# Patient Record
Sex: FEMALE | Race: WHITE | Employment: OTHER | URBAN - METROPOLITAN AREA
[De-identification: names, ages, dates, MRNs, and addresses within clinical notes are randomized per-mention and may not be internally consistent; named-entity substitution may affect disease eponyms.]

---

## 2017-06-29 ENCOUNTER — HOSPITAL ENCOUNTER (OUTPATIENT)
Dept: MAMMOGRAPHY | Age: 67
Discharge: HOME | End: 2017-06-29
Payer: MEDICARE

## 2017-06-29 DIAGNOSIS — R92.8 ABNORMAL MAMMOGRAM: ICD-10-CM

## 2017-06-29 PROCEDURE — G0206 DX MAMMO INCL CAD UNI: HCPCS

## 2018-01-29 ENCOUNTER — HOSPITAL ENCOUNTER (OUTPATIENT)
Dept: MAMMOGRAPHY | Age: 68
Discharge: HOME | End: 2018-01-29
Payer: MEDICARE

## 2018-01-29 DIAGNOSIS — M80.00XA AGE-RELATED OSTEOPOROSIS WITH CURRENT PATHOLOGICAL FRACTURE, INITIAL ENCOUNTER: ICD-10-CM

## 2018-01-29 DIAGNOSIS — R92.8 ABNORMAL MAMMOGRAM: ICD-10-CM

## 2018-01-29 PROCEDURE — G0279 TOMOSYNTHESIS, MAMMO: HCPCS

## 2018-01-29 PROCEDURE — 77080 DXA BONE DENSITY AXIAL: CPT

## 2018-07-20 ENCOUNTER — HOSPITAL ENCOUNTER (OUTPATIENT)
Dept: MAMMOGRAPHY | Age: 68
Discharge: HOME | End: 2018-07-22
Payer: MEDICARE

## 2018-07-20 DIAGNOSIS — R92.8 ABNORMAL MAMMOGRAM: ICD-10-CM

## 2018-07-20 PROCEDURE — G0279 TOMOSYNTHESIS, MAMMO: HCPCS

## 2018-10-27 ENCOUNTER — APPOINTMENT (OUTPATIENT)
Dept: CT IMAGING | Facility: CLINIC | Age: 68
DRG: 262 | End: 2018-10-27
Payer: MEDICARE

## 2018-10-27 ENCOUNTER — HOSPITAL ENCOUNTER (INPATIENT)
Age: 68
LOS: 3 days | Discharge: HOME | DRG: 262 | End: 2018-10-30
Attending: EMERGENCY MEDICINE | Admitting: FAMILY MEDICINE
Payer: MEDICARE

## 2018-10-27 DIAGNOSIS — R55 SYNCOPE AND COLLAPSE: Primary | ICD-10-CM

## 2018-10-27 DIAGNOSIS — S02.2XXA CLOSED FRACTURE OF NASAL BONE, INITIAL ENCOUNTER: ICD-10-CM

## 2018-10-27 DIAGNOSIS — S09.90XA CLOSED HEAD INJURY, INITIAL ENCOUNTER: ICD-10-CM

## 2018-10-27 LAB
-: ABNORMAL
ABSOLUTE EOS #: 0.1 K/UL (ref 0–0.4)
ABSOLUTE IMMATURE GRANULOCYTE: ABNORMAL K/UL (ref 0–0.3)
ABSOLUTE LYMPH #: 2.2 K/UL (ref 1–4.8)
ABSOLUTE MONO #: 0.6 K/UL (ref 0.1–1.2)
AMORPHOUS: ABNORMAL
ANION GAP SERPL CALCULATED.3IONS-SCNC: 14 MMOL/L (ref 9–17)
BACTERIA: ABNORMAL
BASOPHILS # BLD: 1 % (ref 0–2)
BASOPHILS ABSOLUTE: 0.1 K/UL (ref 0–0.2)
BILIRUBIN URINE: NEGATIVE
BUN BLDV-MCNC: 18 MG/DL (ref 8–23)
BUN/CREAT BLD: ABNORMAL (ref 9–20)
CALCIUM SERPL-MCNC: 9.3 MG/DL (ref 8.6–10.4)
CASTS UA: ABNORMAL /LPF (ref 0–2)
CHLORIDE BLD-SCNC: 105 MMOL/L (ref 98–107)
CO2: 24 MMOL/L (ref 20–31)
COLOR: YELLOW
COMMENT UA: ABNORMAL
CREAT SERPL-MCNC: 0.9 MG/DL (ref 0.5–0.9)
CRYSTALS, UA: ABNORMAL /HPF
DIFFERENTIAL TYPE: ABNORMAL
EKG ATRIAL RATE: 71 BPM
EKG P AXIS: 57 DEGREES
EKG P-R INTERVAL: 160 MS
EKG Q-T INTERVAL: 412 MS
EKG QRS DURATION: 96 MS
EKG QTC CALCULATION (BAZETT): 447 MS
EKG R AXIS: 69 DEGREES
EKG T AXIS: 34 DEGREES
EKG VENTRICULAR RATE: 71 BPM
EOSINOPHILS RELATIVE PERCENT: 2 % (ref 1–4)
EPITHELIAL CELLS UA: ABNORMAL /HPF (ref 0–5)
GFR AFRICAN AMERICAN: >60 ML/MIN
GFR NON-AFRICAN AMERICAN: >60 ML/MIN
GFR SERPL CREATININE-BSD FRML MDRD: ABNORMAL ML/MIN/{1.73_M2}
GFR SERPL CREATININE-BSD FRML MDRD: ABNORMAL ML/MIN/{1.73_M2}
GLUCOSE BLD-MCNC: 110 MG/DL (ref 70–99)
GLUCOSE URINE: NEGATIVE
HCT VFR BLD CALC: 47 % (ref 36–46)
HEMOGLOBIN: 15.5 G/DL (ref 12–16)
IMMATURE GRANULOCYTES: ABNORMAL %
INR BLD: 1
KETONES, URINE: NEGATIVE
LEUKOCYTE ESTERASE, URINE: NEGATIVE
LYMPHOCYTES # BLD: 28 % (ref 24–44)
MCH RBC QN AUTO: 32.3 PG (ref 26–34)
MCHC RBC AUTO-ENTMCNC: 33 G/DL (ref 31–37)
MCV RBC AUTO: 97.9 FL (ref 80–100)
MONOCYTES # BLD: 8 % (ref 2–11)
MUCUS: ABNORMAL
NITRITE, URINE: NEGATIVE
NRBC AUTOMATED: ABNORMAL PER 100 WBC
OTHER OBSERVATIONS UA: ABNORMAL
PARTIAL THROMBOPLASTIN TIME: 24.3 SEC (ref 21.3–31.3)
PDW BLD-RTO: 14.6 % (ref 12.5–15.4)
PH UA: 6 (ref 5–8)
PLATELET # BLD: 337 K/UL (ref 140–450)
PLATELET ESTIMATE: ABNORMAL
PMV BLD AUTO: 7.7 FL (ref 6–12)
POTASSIUM SERPL-SCNC: 3.9 MMOL/L (ref 3.7–5.3)
PROTEIN UA: ABNORMAL
PROTHROMBIN TIME: 10.4 SEC (ref 9.4–12.6)
RBC # BLD: 4.8 M/UL (ref 4–5.2)
RBC # BLD: ABNORMAL 10*6/UL
RBC UA: ABNORMAL /HPF (ref 0–2)
RENAL EPITHELIAL, UA: ABNORMAL /HPF
SEG NEUTROPHILS: 61 % (ref 36–66)
SEGMENTED NEUTROPHILS ABSOLUTE COUNT: 4.8 K/UL (ref 1.8–7.7)
SODIUM BLD-SCNC: 143 MMOL/L (ref 135–144)
SPECIFIC GRAVITY UA: 1.03 (ref 1–1.03)
TRICHOMONAS: ABNORMAL
TROPONIN INTERP: NORMAL
TROPONIN T: <0.03 NG/ML
TURBIDITY: CLEAR
URINE HGB: NEGATIVE
UROBILINOGEN, URINE: NORMAL
WBC # BLD: 7.9 K/UL (ref 3.5–11)
WBC # BLD: ABNORMAL 10*3/UL
WBC UA: ABNORMAL /HPF (ref 0–5)
YEAST: ABNORMAL

## 2018-10-27 PROCEDURE — 6370000000 HC RX 637 (ALT 250 FOR IP): Performed by: EMERGENCY MEDICINE

## 2018-10-27 PROCEDURE — 80048 BASIC METABOLIC PNL TOTAL CA: CPT

## 2018-10-27 PROCEDURE — 70486 CT MAXILLOFACIAL W/O DYE: CPT

## 2018-10-27 PROCEDURE — 93005 ELECTROCARDIOGRAM TRACING: CPT

## 2018-10-27 PROCEDURE — 6370000000 HC RX 637 (ALT 250 FOR IP): Performed by: FAMILY MEDICINE

## 2018-10-27 PROCEDURE — 2060000000 HC ICU INTERMEDIATE R&B

## 2018-10-27 PROCEDURE — 85610 PROTHROMBIN TIME: CPT

## 2018-10-27 PROCEDURE — 85025 COMPLETE CBC W/AUTO DIFF WBC: CPT

## 2018-10-27 PROCEDURE — 99285 EMERGENCY DEPT VISIT HI MDM: CPT

## 2018-10-27 PROCEDURE — 70450 CT HEAD/BRAIN W/O DYE: CPT

## 2018-10-27 PROCEDURE — 36415 COLL VENOUS BLD VENIPUNCTURE: CPT

## 2018-10-27 PROCEDURE — 84484 ASSAY OF TROPONIN QUANT: CPT

## 2018-10-27 PROCEDURE — 81001 URINALYSIS AUTO W/SCOPE: CPT

## 2018-10-27 PROCEDURE — 72125 CT NECK SPINE W/O DYE: CPT

## 2018-10-27 PROCEDURE — 85730 THROMBOPLASTIN TIME PARTIAL: CPT

## 2018-10-27 RX ORDER — VENLAFAXINE 25 MG/1
50 TABLET ORAL DAILY
Status: DISCONTINUED | OUTPATIENT
Start: 2018-10-28 | End: 2018-10-29 | Stop reason: CLARIF

## 2018-10-27 RX ORDER — HYDROCODONE BITARTRATE AND ACETAMINOPHEN 5; 325 MG/1; MG/1
1 TABLET ORAL ONCE
Status: COMPLETED | OUTPATIENT
Start: 2018-10-27 | End: 2018-10-27

## 2018-10-27 RX ORDER — LEVOTHYROXINE SODIUM 0.1 MG/1
100 TABLET ORAL DAILY
COMMUNITY

## 2018-10-27 RX ORDER — DOXYCYCLINE 100 MG/1
100 CAPSULE ORAL 2 TIMES DAILY
Status: DISCONTINUED | OUTPATIENT
Start: 2018-10-27 | End: 2018-10-30 | Stop reason: HOSPADM

## 2018-10-27 RX ORDER — HYDROCHLOROTHIAZIDE 25 MG/1
25 TABLET ORAL DAILY
Status: DISCONTINUED | OUTPATIENT
Start: 2018-10-28 | End: 2018-10-28

## 2018-10-27 RX ORDER — MONTELUKAST SODIUM 10 MG/1
10 TABLET ORAL NIGHTLY
Status: DISCONTINUED | OUTPATIENT
Start: 2018-10-27 | End: 2018-10-30 | Stop reason: HOSPADM

## 2018-10-27 RX ORDER — MELOXICAM 15 MG/1
15 TABLET ORAL DAILY
Status: ON HOLD | COMMUNITY
End: 2018-10-30 | Stop reason: HOSPADM

## 2018-10-27 RX ORDER — VENLAFAXINE 50 MG/1
50 TABLET ORAL DAILY
Status: ON HOLD | COMMUNITY
End: 2018-10-29 | Stop reason: DRUGHIGH

## 2018-10-27 RX ORDER — MONTELUKAST SODIUM 10 MG/1
10 TABLET ORAL NIGHTLY
COMMUNITY

## 2018-10-27 RX ORDER — LEVOTHYROXINE SODIUM 0.1 MG/1
100 TABLET ORAL DAILY
Status: DISCONTINUED | OUTPATIENT
Start: 2018-10-28 | End: 2018-10-30 | Stop reason: HOSPADM

## 2018-10-27 RX ORDER — MELOXICAM 7.5 MG/1
15 TABLET ORAL DAILY
Status: DISCONTINUED | OUTPATIENT
Start: 2018-10-28 | End: 2018-10-30 | Stop reason: HOSPADM

## 2018-10-27 RX ORDER — DOXYCYCLINE HYCLATE 100 MG/1
100 CAPSULE ORAL 2 TIMES DAILY
COMMUNITY

## 2018-10-27 RX ORDER — HYDROCODONE BITARTRATE AND ACETAMINOPHEN 5; 325 MG/1; MG/1
1 TABLET ORAL 3 TIMES DAILY PRN
Status: DISCONTINUED | OUTPATIENT
Start: 2018-10-27 | End: 2018-10-30 | Stop reason: HOSPADM

## 2018-10-27 RX ADMIN — HYDROCODONE BITARTRATE AND ACETAMINOPHEN 1 TABLET: 5; 325 TABLET ORAL at 21:36

## 2018-10-27 RX ADMIN — HYDROCODONE BITARTRATE AND ACETAMINOPHEN 1 TABLET: 5; 325 TABLET ORAL at 18:15

## 2018-10-27 ASSESSMENT — PAIN DESCRIPTION - DESCRIPTORS
DESCRIPTORS: ACHING;HEADACHE
DESCRIPTORS: ACHING

## 2018-10-27 ASSESSMENT — PAIN SCALES - GENERAL
PAINLEVEL_OUTOF10: 4
PAINLEVEL_OUTOF10: 6

## 2018-10-27 ASSESSMENT — PAIN DESCRIPTION - FREQUENCY
FREQUENCY: INTERMITTENT
FREQUENCY: INTERMITTENT

## 2018-10-27 ASSESSMENT — PAIN DESCRIPTION - LOCATION
LOCATION: HEAD
LOCATION: HEAD
LOCATION: HEAD;NECK

## 2018-10-27 ASSESSMENT — PAIN DESCRIPTION - PAIN TYPE
TYPE: ACUTE PAIN
TYPE: ACUTE PAIN

## 2018-10-27 ASSESSMENT — PAIN DESCRIPTION - PROGRESSION
CLINICAL_PROGRESSION: GRADUALLY IMPROVING
CLINICAL_PROGRESSION: NOT CHANGED

## 2018-10-27 ASSESSMENT — PAIN DESCRIPTION - ONSET: ONSET: ON-GOING

## 2018-10-27 ASSESSMENT — PAIN DESCRIPTION - ORIENTATION
ORIENTATION: RIGHT;LEFT
ORIENTATION: DISTAL

## 2018-10-27 NOTE — ED NOTES
Pt wishes to go to [facility].      Joaquin Grossman RN  10/27/18 1902

## 2018-10-27 NOTE — ED PROVIDER NOTES
reviewed. DIFFERENTIAL DIAGNOSIS/ MDM:   Have initiated a syncope workup and will look for intracranial trauma and facial trauma. DIAGNOSTIC RESULTS     EKG:  EKG as interpreted by myself as showing a normal sinus rhythm without any acute ST segment changes. Rate axis and intervals are normal.    RADIOLOGY:   Ct Head Wo Contrast    Result Date: 10/27/2018  EXAMINATION: CT OF THE HEAD WITHOUT CONTRAST  10/27/2018 3:59 pm TECHNIQUE: CT of the head was performed without the administration of intravenous contrast. Dose modulation, iterative reconstruction, and/or weight based adjustment of the mA/kV was utilized to reduce the radiation dose to as low as reasonably achievable. COMPARISON: None. HISTORY: ORDERING SYSTEM PROVIDED HISTORY: HEAD INJURY MILD OR MODERATE ACUTE, NO NEUROLOGICAL DEFICIT TECHNOLOGIST PROVIDED HISTORY: Ordering Physician Provided Reason for Exam: pain and bump to left side of head Acuity: Acute Type of Exam: Initial Mechanism of Injury: fall Relevant Medical/Surgical History: NA FINDINGS: INTRACRANIAL STRUCTURES/VENTRICLES: There is mild parenchymal volume loss. There is mild T2/FLAIR hyperintensity in the periventricular and subcortical white matter, likely related to mild chronic microvascular disease. There is no acute infarct. No mass effect or midline shift. No evidence of an acute intracranial hemorrhage. There is no evidence of hydrocephalus. The sellar/suprasellar regions appear unremarkable. The normal signal voids within the major intracranial vessels appear maintained. ORBITS: The visualized portion of the orbits demonstrate no acute abnormality. SINUSES: There is scattered minimal mucosal thickening in the paranasal sinuses. The bilateral mastoid air cells are well aerated. BONES/SOFT TISSUES: The bone marrow signal intensity appears normal.  There is left parietal scalp and midline forehead scalp subcutaneous soft tissue swelling. No acute intracranial abnormality. Injury: fall Relevant Medical/Surgical History: NA; ORDERING SYSTEM PROVIDED HISTORY: NECK PAIN FOLLOWING TRAUMA TECHNOLOGIST PROVIDED HISTORY: Ordering Physician Provided Reason for Exam: posterior neck pain Acuity: Acute Type of Exam: Initial Mechanism of Injury: fall Relevant Medical/Surgical History: NA FINDINGS: CT facial bone: FACIAL BONES: The maxilla, pterygoid plates and zygomatic arches are intact. The mandible is intact. The mandibular condyles are normally situated. There is question of acute nasal bone fractures, left more than right. ORBITS:  The globes appear intact. The extraocular muscles, optic nerve sheath complexes and lacrimal glands appear unremarkable. No retrobulbar hematoma or mass is seen. The orbital walls and rims are intact. SINUSES/MASTOIDS: There is scattered mild mucosal thickening in the paranasal sinuses. The bilateral mastoid air cells are clear. No acute fracture is seen. SOFT TISSUES: There is inferior forehead scalp, bilateral periorbital and nasal soft tissue swelling. CT cervical spine: BONES/ALIGNMENT: There is straightening of normal cervical stenosis. There is normal alignment of the cervical spine. The vertebral body heights are maintained. No osseous destructive lesion is seen. DEGENERATIVE CHANGES: There is degenerative disc disease with mild disc space narrowing, mild endplate changes and small endplate osteophyte formation. There is no evidence of spinal canal narrowing. SOFT TISSUES: No paraspinal mass is seen. Question of acute nasal bone fractures, left more than right. Inferior forehead scalp, bilateral periorbital and nasal soft tissue swelling. No acute abnormality in the cervical spine.          LABS:  Results for orders placed or performed during the hospital encounter of 10/27/18   CBC auto differential   Result Value Ref Range    WBC 7.9 3.5 - 11.0 k/uL    RBC 4.80 4.0 - 5.2 m/uL    Hemoglobin 15.5 12.0 - 16.0 g/dL    Hematocrit 47.0 (H) 36 - 46 % MCV 97.9 80 - 100 fL    MCH 32.3 26 - 34 pg    MCHC 33.0 31 - 37 g/dL    RDW 14.6 12.5 - 15.4 %    Platelets 308 835 - 954 k/uL    MPV 7.7 6.0 - 12.0 fL    NRBC Automated NOT REPORTED per 100 WBC    Differential Type NOT REPORTED     Seg Neutrophils 61 36 - 66 %    Lymphocytes 28 24 - 44 %    Monocytes 8 2 - 11 %    Eosinophils % 2 1 - 4 %    Basophils 1 0 - 2 %    Immature Granulocytes NOT REPORTED 0 %    Segs Absolute 4.80 1.8 - 7.7 k/uL    Absolute Lymph # 2.20 1.0 - 4.8 k/uL    Absolute Mono # 0.60 0.1 - 1.2 k/uL    Absolute Eos # 0.10 0.0 - 0.4 k/uL    Basophils # 0.10 0.0 - 0.2 k/uL    Absolute Immature Granulocyte NOT REPORTED 0.00 - 0.30 k/uL    WBC Morphology NOT REPORTED     RBC Morphology NOT REPORTED     Platelet Estimate NOT REPORTED    Basic Metabolic Panel w/ Reflex to MG   Result Value Ref Range    Glucose 110 (H) 70 - 99 mg/dL    BUN 18 8 - 23 mg/dL    CREATININE 0.90 0.50 - 0.90 mg/dL    Bun/Cre Ratio NOT REPORTED 9 - 20    Calcium 9.3 8.6 - 10.4 mg/dL    Sodium 143 135 - 144 mmol/L    Potassium 3.9 3.7 - 5.3 mmol/L    Chloride 105 98 - 107 mmol/L    CO2 24 20 - 31 mmol/L    Anion Gap 14 9 - 17 mmol/L    GFR Non-African American >60 >60 mL/min    GFR African American >60 >60 mL/min    GFR Comment          GFR Staging NOT REPORTED    APTT   Result Value Ref Range    PTT 24.3 21.3 - 31.3 sec   Troponin   Result Value Ref Range    Troponin T <0.03 <0.03 ng/mL    Troponin Interp         Protime-INR   Result Value Ref Range    Protime 10.4 9.4 - 12.6 sec    INR 1.0    Urinalysis Reflex to Culture   Result Value Ref Range    Color, UA YELLOW YEL    Turbidity UA CLEAR CLEAR    Glucose, Ur NEGATIVE NEG    Bilirubin Urine NEGATIVE NEG    Ketones, Urine NEGATIVE NEG    Specific Gravity, UA 1.026 1.005 - 1.030    Urine Hgb NEGATIVE NEG    pH, UA 6.0 5.0 - 8.0    Protein, UA TRACE (A) NEG    Urobilinogen, Urine Normal NORM    Nitrite, Urine NEGATIVE NEG    Leukocyte Esterase, Urine NEGATIVE NEG

## 2018-10-28 LAB
ANION GAP SERPL CALCULATED.3IONS-SCNC: 10 MMOL/L (ref 9–17)
BUN BLDV-MCNC: 15 MG/DL (ref 8–23)
BUN/CREAT BLD: 19 (ref 9–20)
CALCIUM SERPL-MCNC: 8.9 MG/DL (ref 8.6–10.4)
CHLORIDE BLD-SCNC: 104 MMOL/L (ref 98–107)
CO2: 28 MMOL/L (ref 20–31)
CREAT SERPL-MCNC: 0.8 MG/DL (ref 0.5–0.9)
GFR AFRICAN AMERICAN: >60 ML/MIN
GFR NON-AFRICAN AMERICAN: >60 ML/MIN
GFR SERPL CREATININE-BSD FRML MDRD: NORMAL ML/MIN/{1.73_M2}
GFR SERPL CREATININE-BSD FRML MDRD: NORMAL ML/MIN/{1.73_M2}
GLUCOSE BLD-MCNC: 94 MG/DL (ref 70–99)
POTASSIUM SERPL-SCNC: 4 MMOL/L (ref 3.7–5.3)
SODIUM BLD-SCNC: 142 MMOL/L (ref 135–144)

## 2018-10-28 PROCEDURE — 6370000000 HC RX 637 (ALT 250 FOR IP): Performed by: FAMILY MEDICINE

## 2018-10-28 PROCEDURE — 90686 IIV4 VACC NO PRSV 0.5 ML IM: CPT | Performed by: FAMILY MEDICINE

## 2018-10-28 PROCEDURE — G0008 ADMIN INFLUENZA VIRUS VAC: HCPCS | Performed by: FAMILY MEDICINE

## 2018-10-28 PROCEDURE — 94640 AIRWAY INHALATION TREATMENT: CPT

## 2018-10-28 PROCEDURE — 6360000002 HC RX W HCPCS: Performed by: FAMILY MEDICINE

## 2018-10-28 PROCEDURE — 2060000000 HC ICU INTERMEDIATE R&B

## 2018-10-28 PROCEDURE — 80048 BASIC METABOLIC PNL TOTAL CA: CPT

## 2018-10-28 PROCEDURE — 36415 COLL VENOUS BLD VENIPUNCTURE: CPT

## 2018-10-28 RX ORDER — ZOLPIDEM TARTRATE 5 MG/1
5 TABLET ORAL NIGHTLY PRN
Status: DISCONTINUED | OUTPATIENT
Start: 2018-10-28 | End: 2018-10-30 | Stop reason: HOSPADM

## 2018-10-28 RX ORDER — IBUPROFEN 400 MG/1
400 TABLET ORAL 3 TIMES DAILY PRN
Status: DISCONTINUED | OUTPATIENT
Start: 2018-10-28 | End: 2018-10-30 | Stop reason: HOSPADM

## 2018-10-28 RX ADMIN — ZOLPIDEM TARTRATE 5 MG: 5 TABLET ORAL at 22:21

## 2018-10-28 RX ADMIN — HYDROCODONE BITARTRATE AND ACETAMINOPHEN 1 TABLET: 5; 325 TABLET ORAL at 13:11

## 2018-10-28 RX ADMIN — VENLAFAXINE 50 MG: 25 TABLET ORAL at 09:09

## 2018-10-28 RX ADMIN — MOMETASONE FUROATE AND FORMOTEROL FUMARATE DIHYDRATE 2 PUFF: 100; 5 AEROSOL RESPIRATORY (INHALATION) at 21:02

## 2018-10-28 RX ADMIN — HYDROCODONE BITARTRATE AND ACETAMINOPHEN 1 TABLET: 5; 325 TABLET ORAL at 06:26

## 2018-10-28 RX ADMIN — DOXYCYCLINE 100 MG: 100 CAPSULE ORAL at 09:09

## 2018-10-28 RX ADMIN — MONTELUKAST SODIUM 10 MG: 10 TABLET, FILM COATED ORAL at 21:52

## 2018-10-28 RX ADMIN — LEVOTHYROXINE SODIUM 100 MCG: 100 TABLET ORAL at 06:23

## 2018-10-28 RX ADMIN — IBUPROFEN 400 MG: 400 TABLET, FILM COATED ORAL at 19:39

## 2018-10-28 RX ADMIN — MELOXICAM 15 MG: 7.5 TABLET ORAL at 09:09

## 2018-10-28 RX ADMIN — DOXYCYCLINE 100 MG: 100 CAPSULE ORAL at 21:52

## 2018-10-28 RX ADMIN — INFLUENZA A VIRUS A/MICHIGAN/45/2015 X-275 (H1N1) ANTIGEN (FORMALDEHYDE INACTIVATED), INFLUENZA A VIRUS A/SINGAPORE/INFIMH-16-0019/2016 IVR-186 (H3N2) ANTIGEN (FORMALDEHYDE INACTIVATED), INFLUENZA B VIRUS B/PHUKET/3073/2013 ANTIGEN (FORMALDEHYDE INACTIVATED), AND INFLUENZA B VIRUS B/MARYLAND/15/2016 BX-69A ANTIGEN (FORMALDEHYDE INACTIVATED) 0.5 ML: 15; 15; 15; 15 INJECTION, SUSPENSION INTRAMUSCULAR at 09:08

## 2018-10-28 ASSESSMENT — PAIN SCALES - GENERAL
PAINLEVEL_OUTOF10: 3
PAINLEVEL_OUTOF10: 6
PAINLEVEL_OUTOF10: 5
PAINLEVEL_OUTOF10: 7
PAINLEVEL_OUTOF10: 7

## 2018-10-28 ASSESSMENT — ENCOUNTER SYMPTOMS
ORTHOPNEA: 0
EYE DISCHARGE: 0
VOMITING: 0
PHOTOPHOBIA: 0
DOUBLE VISION: 0
BACK PAIN: 1
BLOOD IN STOOL: 0
WHEEZING: 0
EYE PAIN: 0
CONSTIPATION: 0
COUGH: 1
HEARTBURN: 0
SPUTUM PRODUCTION: 1
BLURRED VISION: 0
ABDOMINAL PAIN: 0
SHORTNESS OF BREATH: 0
DIARRHEA: 0
NAUSEA: 0
SINUS PAIN: 1

## 2018-10-28 ASSESSMENT — PAIN DESCRIPTION - ONSET
ONSET: ON-GOING
ONSET: ON-GOING

## 2018-10-28 ASSESSMENT — PAIN DESCRIPTION - PROGRESSION
CLINICAL_PROGRESSION: NOT CHANGED
CLINICAL_PROGRESSION: NOT CHANGED

## 2018-10-28 ASSESSMENT — PAIN DESCRIPTION - PAIN TYPE
TYPE: ACUTE PAIN
TYPE: ACUTE PAIN

## 2018-10-28 ASSESSMENT — PAIN DESCRIPTION - LOCATION
LOCATION: NECK;HEAD
LOCATION: HEAD;NECK

## 2018-10-28 ASSESSMENT — PAIN DESCRIPTION - FREQUENCY
FREQUENCY: CONTINUOUS
FREQUENCY: CONTINUOUS

## 2018-10-28 ASSESSMENT — PAIN DESCRIPTION - DESCRIPTORS
DESCRIPTORS: ACHING;DISCOMFORT;HEADACHE
DESCRIPTORS: ACHING;DISCOMFORT;HEADACHE

## 2018-10-28 NOTE — CARE COORDINATION
Case Management Initial Discharge Plan  Nadeem Ugarte,         Readmission Risk              Risk of Unplanned Readmission:        5             Met with:patient to discuss discharge plans. Information verified: address, contacts, phone number, , insurance Yes  PCP: Mason Cunha  Date of last visit: 6 weeks ago    Insurance Provider: Medicare and [place]    Discharge Planning  Current Residence:  private  Living Arrangements:  Spouse/Significant Other   Home has 1 stories/2 stairs to climb  Support Systems:  Spouse/Significant Other  Current Services PTA:  none Agency: none  Patient able to perform ADL's:Independent  DME in home:  nebulizer  DME used to aid ambulation prior to admission:   none  DME used during admission:  none    Potential Assistance Needed:  N/A    Ph[facility] on [address]   Potential Assistance Purchasing Medications:  No  Does patient want to participate in local refill/ meds to beds program?  Yes    Patient agreeable to home care: No  [place] of choice provided:  no      Type of Home Care Services:  None  Patient expects to be discharged to:  Home    Prior SNF/Rehab Placement and Facility: none  Agreeable to SNF/Rehab: No  [facility] of choice provided: no   Evaluation: no    Expected Discharge date:  10/28/18  Follow Up Appointment: Best Day/ Time: Monday AM    Transportation provider: yes, spouse  Transportation arrangements needed for discharge: No    Discharge Plan: home w/ family support  Lives w/ spouse in a 1 level home w/ no steps  Independent of her adl's  No home need identified  For f/u appointment will make it herself.   Will continue to follow        Electronically signed by Luis Morocho RN on 10/28/18 at 11:21 AM

## 2018-10-29 ENCOUNTER — APPOINTMENT (OUTPATIENT)
Dept: MRI IMAGING | Age: 68
DRG: 262 | End: 2018-10-29
Payer: MEDICARE

## 2018-10-29 LAB
LV EF: 65 %
LVEF MODALITY: NORMAL

## 2018-10-29 PROCEDURE — 2060000000 HC ICU INTERMEDIATE R&B

## 2018-10-29 PROCEDURE — 93880 EXTRACRANIAL BILAT STUDY: CPT

## 2018-10-29 PROCEDURE — 6370000000 HC RX 637 (ALT 250 FOR IP): Performed by: FAMILY MEDICINE

## 2018-10-29 PROCEDURE — G9168 MEMORY CURRENT STATUS: HCPCS

## 2018-10-29 PROCEDURE — A9579 GAD-BASE MR CONTRAST NOS,1ML: HCPCS | Performed by: PSYCHIATRY & NEUROLOGY

## 2018-10-29 PROCEDURE — 94640 AIRWAY INHALATION TREATMENT: CPT

## 2018-10-29 PROCEDURE — 72141 MRI NECK SPINE W/O DYE: CPT

## 2018-10-29 PROCEDURE — 92523 SPEECH SOUND LANG COMPREHEN: CPT

## 2018-10-29 PROCEDURE — 6360000004 HC RX CONTRAST MEDICATION: Performed by: PSYCHIATRY & NEUROLOGY

## 2018-10-29 PROCEDURE — G9170 MEMORY D/C STATUS: HCPCS

## 2018-10-29 PROCEDURE — 70553 MRI BRAIN STEM W/O & W/DYE: CPT

## 2018-10-29 PROCEDURE — G9169 MEMORY GOAL STATUS: HCPCS

## 2018-10-29 PROCEDURE — 93306 TTE W/DOPPLER COMPLETE: CPT

## 2018-10-29 RX ORDER — LEVALBUTEROL TARTRATE 45 UG/1
1-2 AEROSOL, METERED ORAL EVERY 6 HOURS PRN
COMMUNITY

## 2018-10-29 RX ORDER — M-VIT,TX,IRON,MINS/CALC/FOLIC 27MG-0.4MG
1 TABLET ORAL DAILY
COMMUNITY

## 2018-10-29 RX ORDER — ONDANSETRON 4 MG/1
4 TABLET, ORALLY DISINTEGRATING ORAL EVERY 6 HOURS PRN
Status: DISCONTINUED | OUTPATIENT
Start: 2018-10-29 | End: 2018-10-30 | Stop reason: HOSPADM

## 2018-10-29 RX ORDER — ONDANSETRON 2 MG/ML
4 INJECTION INTRAMUSCULAR; INTRAVENOUS EVERY 6 HOURS PRN
Status: DISCONTINUED | OUTPATIENT
Start: 2018-10-29 | End: 2018-10-29

## 2018-10-29 RX ORDER — VENLAFAXINE 25 MG/1
50 TABLET ORAL DAILY
Status: DISCONTINUED | OUTPATIENT
Start: 2018-10-29 | End: 2018-10-30 | Stop reason: HOSPADM

## 2018-10-29 RX ORDER — ONDANSETRON 2 MG/ML
4 INJECTION INTRAMUSCULAR; INTRAVENOUS EVERY 6 HOURS PRN
Status: DISCONTINUED | OUTPATIENT
Start: 2018-10-29 | End: 2018-10-30 | Stop reason: HOSPADM

## 2018-10-29 RX ORDER — VENLAFAXINE HYDROCHLORIDE 150 MG/1
150 CAPSULE, EXTENDED RELEASE ORAL DAILY
COMMUNITY

## 2018-10-29 RX ORDER — LORATADINE 10 MG/1
10 TABLET ORAL DAILY
COMMUNITY

## 2018-10-29 RX ORDER — IPRATROPIUM BROMIDE AND ALBUTEROL SULFATE 2.5; .5 MG/3ML; MG/3ML
1 SOLUTION RESPIRATORY (INHALATION) EVERY 6 HOURS PRN
COMMUNITY

## 2018-10-29 RX ADMIN — HYDROCODONE BITARTRATE AND ACETAMINOPHEN 1 TABLET: 5; 325 TABLET ORAL at 15:22

## 2018-10-29 RX ADMIN — MONTELUKAST SODIUM 10 MG: 10 TABLET, FILM COATED ORAL at 20:20

## 2018-10-29 RX ADMIN — MELOXICAM 15 MG: 7.5 TABLET ORAL at 08:31

## 2018-10-29 RX ADMIN — MOMETASONE FUROATE AND FORMOTEROL FUMARATE DIHYDRATE 2 PUFF: 100; 5 AEROSOL RESPIRATORY (INHALATION) at 20:22

## 2018-10-29 RX ADMIN — IBUPROFEN 400 MG: 400 TABLET, FILM COATED ORAL at 20:19

## 2018-10-29 RX ADMIN — DOXYCYCLINE 100 MG: 100 CAPSULE ORAL at 08:31

## 2018-10-29 RX ADMIN — HYDROCODONE BITARTRATE AND ACETAMINOPHEN 1 TABLET: 5; 325 TABLET ORAL at 00:12

## 2018-10-29 RX ADMIN — HYDROCODONE BITARTRATE AND ACETAMINOPHEN 1 TABLET: 5; 325 TABLET ORAL at 23:18

## 2018-10-29 RX ADMIN — GADOTERIDOL 20 ML: 279.3 INJECTION, SOLUTION INTRAVENOUS at 13:35

## 2018-10-29 RX ADMIN — VENLAFAXINE 50 MG: 25 TABLET ORAL at 08:31

## 2018-10-29 RX ADMIN — MOMETASONE FUROATE AND FORMOTEROL FUMARATE DIHYDRATE 2 PUFF: 100; 5 AEROSOL RESPIRATORY (INHALATION) at 09:37

## 2018-10-29 RX ADMIN — HYDROCODONE BITARTRATE AND ACETAMINOPHEN 1 TABLET: 5; 325 TABLET ORAL at 08:36

## 2018-10-29 RX ADMIN — IBUPROFEN 400 MG: 400 TABLET, FILM COATED ORAL at 12:43

## 2018-10-29 RX ADMIN — DOXYCYCLINE 100 MG: 100 CAPSULE ORAL at 20:19

## 2018-10-29 RX ADMIN — LEVOTHYROXINE SODIUM 100 MCG: 100 TABLET ORAL at 06:20

## 2018-10-29 ASSESSMENT — PAIN SCALES - GENERAL
PAINLEVEL_OUTOF10: 3
PAINLEVEL_OUTOF10: 5
PAINLEVEL_OUTOF10: 1
PAINLEVEL_OUTOF10: 5
PAINLEVEL_OUTOF10: 7
PAINLEVEL_OUTOF10: 7
PAINLEVEL_OUTOF10: 3
PAINLEVEL_OUTOF10: 5
PAINLEVEL_OUTOF10: 7

## 2018-10-29 NOTE — CONSULTS
[address] [address]               [address] [facility], [address] [place] [address]                                 CONSULTATION    PATIENT NAME: Luzmaria Adamson                    :         1950  MED REC NO:   [id number]                             ROOM:       1009  ACCOUNT NO:   [de-identified]                           ADMIT DATE:  10/27/2018  PROVIDER:     Armando Carney DATE:  10/29/2018    NEUROLOGY CONSULTATION    HISTORY OF PRESENT ILLNESS:  This patient is a 59-year-old female whom  I am asked to see in neurology consultation for advice and opinion  regarding syncope. The patient was sitting on a chair holding her dog  when the dog whined about needing to go outside, the patient stated  that she immediately stood up and was heading for the door when she  passed out and fell face first on to the floor. She did not have any  warning. She denied any palpitations, diaphoresis, lightheadedness,  or nausea. She did not bite her tongue. She did not lose control of  her bladder or bowel. She had no postictal confusion, but did have  fatigue and headache. She never experienced anything like this  before. She has no history of meningitis or encephalitis nor previous  seizure nor head injury, although she did injure her head when she  fell face down and hitting the hardwood floor. PAST MEDICAL HISTORY:  Significant for arthritis and thyroid disease,  but negative for diabetes, heart disease, cancer, stroke, or seizures. FAMILY HISTORY:  Noncontributory and there is no family history of  seizures. REVIEW OF SYSTEMS:  She denied any chest pain, shortness of breath,  abdominal pain, hematochezia, hematuria, nosebleed, rash, or fever. PERSONAL AND SOCIAL HISTORY:  She has never smoked. She does drink  alcohol. She does not use street drugs. MEDICATIONS:  Are as on the electronic health record. ALLERGIES:  She has allergies to PENICILLIN.     PHYSICAL

## 2018-10-30 VITALS
TEMPERATURE: 98.4 F | WEIGHT: 225 LBS | HEART RATE: 84 BPM | DIASTOLIC BLOOD PRESSURE: 62 MMHG | BODY MASS INDEX: 39.87 KG/M2 | HEIGHT: 63 IN | OXYGEN SATURATION: 94 % | RESPIRATION RATE: 18 BRPM | SYSTOLIC BLOOD PRESSURE: 143 MMHG

## 2018-10-30 PROCEDURE — 6370000000 HC RX 637 (ALT 250 FOR IP): Performed by: FAMILY MEDICINE

## 2018-10-30 PROCEDURE — 2500000003 HC RX 250 WO HCPCS

## 2018-10-30 PROCEDURE — 94640 AIRWAY INHALATION TREATMENT: CPT

## 2018-10-30 PROCEDURE — 33282 HC IMPANTABLE LOOP RECORDER: CPT | Performed by: INTERNAL MEDICINE

## 2018-10-30 PROCEDURE — 97161 PT EVAL LOW COMPLEX 20 MIN: CPT

## 2018-10-30 PROCEDURE — G8978 MOBILITY CURRENT STATUS: HCPCS

## 2018-10-30 PROCEDURE — G8980 MOBILITY D/C STATUS: HCPCS

## 2018-10-30 PROCEDURE — 97530 THERAPEUTIC ACTIVITIES: CPT

## 2018-10-30 PROCEDURE — G8979 MOBILITY GOAL STATUS: HCPCS

## 2018-10-30 PROCEDURE — C1764 EVENT RECORDER, CARDIAC: HCPCS

## 2018-10-30 PROCEDURE — 0JH632Z INSERTION OF MONITORING DEVICE INTO CHEST SUBCUTANEOUS TISSUE AND FASCIA, PERCUTANEOUS APPROACH: ICD-10-PCS | Performed by: INTERNAL MEDICINE

## 2018-10-30 RX ORDER — CYCLOBENZAPRINE HCL 10 MG
TABLET ORAL
Qty: 30 TABLET | Refills: 1 | Status: SHIPPED | OUTPATIENT
Start: 2018-10-30

## 2018-10-30 RX ORDER — HYDROCODONE BITARTRATE AND ACETAMINOPHEN 5; 325 MG/1; MG/1
1 TABLET ORAL EVERY 6 HOURS PRN
Qty: 28 TABLET | Refills: 0 | Status: SHIPPED | OUTPATIENT
Start: 2018-10-30 | End: 2018-11-06

## 2018-10-30 RX ORDER — ONDANSETRON 4 MG/1
8 TABLET, ORALLY DISINTEGRATING ORAL EVERY 8 HOURS PRN
Qty: 20 TABLET | Refills: 1 | Status: SHIPPED | OUTPATIENT
Start: 2018-10-30

## 2018-10-30 RX ADMIN — IBUPROFEN 400 MG: 400 TABLET, FILM COATED ORAL at 15:17

## 2018-10-30 RX ADMIN — MOMETASONE FUROATE AND FORMOTEROL FUMARATE DIHYDRATE 2 PUFF: 100; 5 AEROSOL RESPIRATORY (INHALATION) at 09:20

## 2018-10-30 RX ADMIN — IBUPROFEN 400 MG: 400 TABLET, FILM COATED ORAL at 07:36

## 2018-10-30 RX ADMIN — LEVOTHYROXINE SODIUM 100 MCG: 100 TABLET ORAL at 07:28

## 2018-10-30 RX ADMIN — HYDROCODONE BITARTRATE AND ACETAMINOPHEN 1 TABLET: 5; 325 TABLET ORAL at 08:39

## 2018-10-30 RX ADMIN — DOXYCYCLINE 100 MG: 100 CAPSULE ORAL at 08:21

## 2018-10-30 ASSESSMENT — PAIN SCALES - GENERAL
PAINLEVEL_OUTOF10: 7
PAINLEVEL_OUTOF10: 4

## 2018-10-30 ASSESSMENT — PAIN DESCRIPTION - PAIN TYPE: TYPE: ACUTE PAIN

## 2018-10-30 ASSESSMENT — PAIN DESCRIPTION - LOCATION: LOCATION: GENERALIZED

## 2018-10-30 NOTE — PROGRESS NOTES
EEG complete, results to follow
[facility] Cardiology  Attending Progress Note  Follow up syncope      S: Denies dizziness, chest pain and shortness of breath. Feel sore    O: In bed, not in distress. MRI and echo pending    /72   Pulse 89   Temp 98.6 °F (37 °C) (Oral)   Resp 18   Ht 5' 3\" (1.6 m)   Wt 225 lb (102.1 kg)   SpO2 92%   BMI 39.86 kg/m²     Intake/Output Summary (Last 24 hours) at 10/29/18 1422  Last data filed at 10/29/18 1207   Gross per 24 hour   Intake              240 ml   Output                0 ml   Net              240 ml       CONSTITUTIONAL:  awake, alert, cooperative, no apparent distress, and appears stated age  LUNGS:  No increased work of breathing, good air exchange, clear to auscultation bilaterally, no crackles or wheezing  CARDIOVASCULAR:  Normal apical impulse, regular rate and rhythm, normal S1 and S2, no S3 or S4, and no murmur noted  ABDOMEN:  No scars, normal bowel sounds, soft, non-distended, non-tender  MUSCULOSKELETAL:  there is no redness, warmth, or swelling of the joints  NEUROLOGIC:  Awake, alert, oriented to name, place and time.    SKIN:  no bruising or bleeding    Labs:  Lab Results   Component Value Date    WBC 7.9 10/27/2018    HGB 15.5 10/27/2018     10/27/2018     Lab Results   Component Value Date     10/28/2018    K 4.0 10/28/2018    CO2 28 10/28/2018    BUN 15 10/28/2018    CREATININE 0.80 10/28/2018     Lab Results   Component Value Date    PROTIME 10.4 10/27/2018    INR 1.0 10/27/2018         Scheduled Meds:   venlafaxine  50 mg Oral Daily    doxycycline monohydrate  100 mg Oral BID    levothyroxine  100 mcg Oral Daily    meloxicam  15 mg Oral Daily    mometasone-formoterol  2 puff Inhalation BID    montelukast  10 mg Oral Nightly     Continuous Infusions:  PRN Meds:.ondansetron **OR** ondansetron, zolpidem, ibuprofen, HYDROcodone 5 mg - acetaminophen    A:    Syncope    P:    Echo and MRI pending  Loop recorder prior to discharge  Dr Daniel Velazquez to place
Transitions of Care Pharmacy Service   Medication Review    The patient's list of current home medications has been reviewed. Source(s) of information: Patient/ medication bottles    Based on information provided by the above source(s), I have updated the patient's home med list as described below. Please review the ACTION REQUESTED BY PHYSICIAN section of this note below for any discrepancies on current hospital orders. I changed or updated the following medications on the patient's home medication list:  Discontinued · none     Added · Xopenex 45mcg inhaler - q6 hrs prn  · Loratidine 10mg daily  · Multivitamin daily  · Duoneb 1 vial q6hrs via nebulizer prn     Adjusted   · Effexor 50mg daily to XR 150mg daily   Other Notes · Gets meds from [facility] and [facility] via . PHYSICIAN ACTION REQUESTED  Discrepancies on current hospital orders that need to be addressed by a physician:    Medication Action Requested   Effexor  Loratidine  MVI     Please adjust dose to home dosing as appropriate  Please review/reconcile and order as appropriate. Please feel free to call me with any questions about this encounter. Thank you.     Shruthi Gambino PharmD  Transitions of Care Pharmacy Service  Phone:  [phone]  Fax: [phone]      Electronically signed by Shruthi Gambino [facility] on 10/29/2018 at 11:19 AM         Prescriptions Prior to Admission: venlafaxine (EFFEXOR XR) 150 MG extended release capsule, Take 150 mg by mouth daily  levalbuterol (XOPENEX HFA) 45 MCG/ACT inhaler, Inhale 1-2 puffs into the lungs every 6 hours as needed for Shortness of Breath  loratadine (CLARITIN) 10 MG tablet, Take 10 mg by mouth daily  Multiple Vitamins-Minerals (THERAPEUTIC MULTIVITAMIN-MINERALS) tablet, Take 1 tablet by mouth daily  ipratropium-albuterol (DUONEB) 0.5-2.5 (3) MG/3ML SOLN nebulizer solution, Inhale 1 vial into the lungs every 6 hours as needed for Shortness of Breath  mometasone-formoterol
Verbal and written education provided. Recommendations:  Requires SLP Intervention: No  Duration/Frequency of Treatment: no need  D/C Recommendations: Home independently       Patient/family involved in developing goals and treatment plan: yes    Subjective:   Previous level of function and limitations: independent, lives with   General  Chart Reviewed: Yes  Social/Functional History  Lives With: Spouse  Vision  Vision: Impaired  Hearing  Hearing: Exceptions to [facility]           Objective:     Oral/Motor  Oral Motor: Within functional limits    Motor Speech  Motor Speech:  Within Functional Limits         Cognition:      Orientation  Overall Orientation Status: Within Normal Limits  Attention  Attention: Within Functional Limits  Problem Solving  Problem Solving: Within Functional Limits  Abstract Reasoning  Abstract Reasoning: Within Functional Limits  Safety/Judgement  Safety/Judgement: Within Functional Limits      Prognosis:  Speech Therapy Prognosis  Prognosis: Good    Education:  Patient Education: yes  Patient Education Response: Verbalizes understanding    G-Code:  SLP G-Codes  Functional Limitations: Memory  Memory Current Status (): 0 percent impaired, limited or restricted  Memory Goal Status (): 0 percent impaired, limited or restricted  Memory Discharge Status (): 0 percent impaired, limited or restricted         Therapy Time:   Individual Concurrent Group Co-treatment   Time In 1430         Time Out 1450         Minutes [address] [facility], SLP  10/29/2018 2:49 PM
no adenopathy, no carotid bruit, no JVD, supple, symmetrical, trachea midline and thyroid not enlarged, symmetric, no tenderness/mass/nodules  Lungs: clear to auscultation bilaterally  Heart: regular rate and rhythm, S1, S2 normal, no murmur, click, rub or gallop  Abdomen: soft, non-tender; bowel sounds normal; no masses,  no organomegaly  Extremities: extremities normal, atraumatic, no cyanosis or edema  Lymphatic: No significant lymph node enlargement papable  Neurologic: Mental status: Alert, oriented, thought content appropriate      Assessment & Plan:    Patient Active Problem List:     Concussion, facial contusion      Nasal fracture      Hx of balance disorder       presycope     Syncope and collapse      See orders   Disposition: see orders    Sushil Howard

## 2019-08-21 ENCOUNTER — HOSPITAL ENCOUNTER (OUTPATIENT)
Dept: MAMMOGRAPHY | Age: 69
Discharge: HOME | End: 2019-08-23
Payer: MEDICARE

## 2019-08-21 DIAGNOSIS — Z12.31 VISIT FOR SCREENING MAMMOGRAM: ICD-10-CM

## 2019-08-21 PROCEDURE — 77063 BREAST TOMOSYNTHESIS BI: CPT

## 2021-06-22 ENCOUNTER — APPOINTMENT (OUTPATIENT)
Dept: MAMMOGRAPHY | Age: 71
End: 2021-06-22
Payer: MEDICARE

## 2021-06-22 ENCOUNTER — HOSPITAL ENCOUNTER (OUTPATIENT)
Dept: MAMMOGRAPHY | Age: 71
Discharge: HOME | End: 2021-06-24
Payer: MEDICARE

## 2021-06-22 DIAGNOSIS — Z12.31 SCREENING MAMMOGRAM FOR HIGH-RISK PATIENT: ICD-10-CM

## 2021-06-22 DIAGNOSIS — Z78.0 POSTMENOPAUSAL: ICD-10-CM

## 2021-06-22 PROCEDURE — 77063 BREAST TOMOSYNTHESIS BI: CPT

## 2021-08-10 ENCOUNTER — HOSPITAL ENCOUNTER (OUTPATIENT)
Dept: MAMMOGRAPHY | Age: 71
Discharge: HOME | End: 2021-08-12
Payer: MEDICARE

## 2021-08-10 PROCEDURE — 77080 DXA BONE DENSITY AXIAL: CPT
